# Patient Record
Sex: MALE | Race: OTHER | NOT HISPANIC OR LATINO | URBAN - METROPOLITAN AREA
[De-identification: names, ages, dates, MRNs, and addresses within clinical notes are randomized per-mention and may not be internally consistent; named-entity substitution may affect disease eponyms.]

---

## 2017-08-04 ENCOUNTER — EMERGENCY (EMERGENCY)
Facility: HOSPITAL | Age: 32
LOS: 1 days | Discharge: HOME | End: 2017-08-04
Attending: EMERGENCY MEDICINE
Payer: SELF-PAY

## 2017-08-04 VITALS
SYSTOLIC BLOOD PRESSURE: 124 MMHG | DIASTOLIC BLOOD PRESSURE: 88 MMHG | OXYGEN SATURATION: 99 % | WEIGHT: 139.99 LBS | TEMPERATURE: 98 F | HEIGHT: 65 IN | HEART RATE: 92 BPM | RESPIRATION RATE: 16 BRPM

## 2017-08-04 DIAGNOSIS — S81.811A LACERATION WITHOUT FOREIGN BODY, RIGHT LOWER LEG, INITIAL ENCOUNTER: ICD-10-CM
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
## 2017-08-04 PROCEDURE — 99283 EMERGENCY DEPT VISIT LOW MDM: CPT | Mod: 25

## 2017-08-04 PROCEDURE — 12002 RPR S/N/AX/GEN/TRNK2.6-7.5CM: CPT

## 2017-08-04 PROCEDURE — 90715 TDAP VACCINE 7 YRS/> IM: CPT

## 2017-08-04 PROCEDURE — 90471 IMMUNIZATION ADMIN: CPT

## 2017-08-04 RX ORDER — TETANUS TOXOID, REDUCED DIPHTHERIA TOXOID AND ACELLULAR PERTUSSIS VACCINE, ADSORBED 5; 2.5; 8; 8; 2.5 [IU]/.5ML; [IU]/.5ML; UG/.5ML; UG/.5ML; UG/.5ML
0.5 SUSPENSION INTRAMUSCULAR ONCE
Qty: 0 | Refills: 0 | Status: COMPLETED | OUTPATIENT
Start: 2017-08-04 | End: 2017-08-04

## 2017-08-04 RX ADMIN — TETANUS TOXOID, REDUCED DIPHTHERIA TOXOID AND ACELLULAR PERTUSSIS VACCINE, ADSORBED 0.5 MILLILITER(S): 5; 2.5; 8; 8; 2.5 SUSPENSION INTRAMUSCULAR at 11:51

## 2017-08-04 NOTE — ED PROVIDER NOTE - OBJECTIVE STATEMENT
32 y/o male with no significant PMHx presents to ED c/o laceration on R distal calf s/p cut with glass. Pt is unsure if Tetanus is UTD. He notes the area is not actively bleeding. He denies any HA, rash and any other symptoms at the moment.

## 2017-08-04 NOTE — ED ADULT NURSE NOTE - OBJECTIVE STATEMENT
axox3 ,nad ,walked in with c/o got laceration on R lower leg today at work . axox3 ,nad ,walked in with c/o got laceration on R lower leg today at work. No active bleeding. Able to ambulate, steady gait. Patient states cut his right lower leg with glass x today. Denies any pain at this time.